# Patient Record
Sex: MALE | Race: WHITE | NOT HISPANIC OR LATINO | Employment: FULL TIME | URBAN - METROPOLITAN AREA
[De-identification: names, ages, dates, MRNs, and addresses within clinical notes are randomized per-mention and may not be internally consistent; named-entity substitution may affect disease eponyms.]

---

## 2020-01-27 ENCOUNTER — HOSPITAL ENCOUNTER (EMERGENCY)
Facility: MEDICAL CENTER | Age: 25
End: 2020-01-27
Attending: EMERGENCY MEDICINE

## 2020-01-27 VITALS
RESPIRATION RATE: 16 BRPM | OXYGEN SATURATION: 96 % | DIASTOLIC BLOOD PRESSURE: 64 MMHG | TEMPERATURE: 97.5 F | HEART RATE: 54 BPM | WEIGHT: 123.22 LBS | HEIGHT: 66 IN | BODY MASS INDEX: 19.8 KG/M2 | SYSTOLIC BLOOD PRESSURE: 122 MMHG

## 2020-01-27 DIAGNOSIS — K04.7 DENTAL INFECTION: Primary | ICD-10-CM

## 2020-01-27 PROCEDURE — 700102 HCHG RX REV CODE 250 W/ 637 OVERRIDE(OP): Performed by: EMERGENCY MEDICINE

## 2020-01-27 PROCEDURE — A9270 NON-COVERED ITEM OR SERVICE: HCPCS | Performed by: EMERGENCY MEDICINE

## 2020-01-27 PROCEDURE — 99283 EMERGENCY DEPT VISIT LOW MDM: CPT

## 2020-01-27 RX ORDER — IBUPROFEN 200 MG
200 TABLET ORAL EVERY 6 HOURS PRN
COMMUNITY

## 2020-01-27 RX ORDER — AMOXICILLIN AND CLAVULANATE POTASSIUM 875; 125 MG/1; MG/1
1 TABLET, FILM COATED ORAL ONCE
Status: COMPLETED | OUTPATIENT
Start: 2020-01-27 | End: 2020-01-27

## 2020-01-27 RX ORDER — HYDROCODONE BITARTRATE AND ACETAMINOPHEN 5; 325 MG/1; MG/1
1 TABLET ORAL ONCE
Status: COMPLETED | OUTPATIENT
Start: 2020-01-27 | End: 2020-01-27

## 2020-01-27 RX ORDER — AMOXICILLIN AND CLAVULANATE POTASSIUM 875; 125 MG/1; MG/1
1 TABLET, FILM COATED ORAL 2 TIMES DAILY
Qty: 14 TAB | Refills: 0 | Status: SHIPPED | OUTPATIENT
Start: 2020-01-27 | End: 2020-02-03

## 2020-01-27 RX ADMIN — AMOXICILLIN AND CLAVULANATE POTASSIUM 1 TABLET: 875; 125 TABLET, FILM COATED ORAL at 17:14

## 2020-01-27 RX ADMIN — HYDROCODONE BITARTRATE AND ACETAMINOPHEN 1 TABLET: 5; 325 TABLET ORAL at 17:14

## 2020-01-27 SDOH — HEALTH STABILITY: MENTAL HEALTH: HOW OFTEN DO YOU HAVE 6 OR MORE DRINKS ON ONE OCCASION?: NEVER

## 2020-01-27 SDOH — HEALTH STABILITY: MENTAL HEALTH: HOW MANY STANDARD DRINKS CONTAINING ALCOHOL DO YOU HAVE ON A TYPICAL DAY?: 1 OR 2

## 2020-01-27 SDOH — HEALTH STABILITY: MENTAL HEALTH: HOW OFTEN DO YOU HAVE A DRINK CONTAINING ALCOHOL?: 2-4 TIMES A MONTH

## 2020-01-27 ASSESSMENT — ENCOUNTER SYMPTOMS
EYE DISCHARGE: 0
FEVER: 0
EYE REDNESS: 0
EYE PAIN: 0

## 2020-01-27 NOTE — ED TRIAGE NOTES
Chief Complaint   Patient presents with   • Dental Pain     x2 months, gradually worsening x2 days   • Jaw Pain     x2 months, denies trauma     Patient to triage via ambulation, with a steady gait, patient A&O x4.  Patient denies difficulty breathing, managing oral secretions w/o difficulty.      Explained wait time and triage process to pt. Pt placed back in lobby, told to notify ED tech or triage RN of any changes, verbalized understanding.

## 2020-01-28 NOTE — ED PROVIDER NOTES
"ED Provider Note   1/27/2020  4:52 PM    Means of Arrival: Walk In  History obtained by: patient  Limitations: none    CHIEF COMPLAINT  Chief Complaint   Patient presents with   • Dental Pain     x2 months, gradually worsening x2 days   • Jaw Pain     x2 months, denies trauma       HPI  Suhas Haro is a 24 y.o. male otherwise healthy presents with concerns of left maxillary face pain.  Pain started 2 days ago.  He has had similar pain before.  However pain has never been this bad and has been progressing over the past 2 days.  No fevers.  No difficulty swallowing.  No trismus or malocclusion.  Describes the pain as throbbing radiating across right cheek.  He believes there is also a small amount of swelling there.  He has tried Advil but continues to have the pain.    REVIEW OF SYSTEMS  Review of Systems   Constitutional: Negative for fever.   HENT:        See HPI   Eyes: Negative for pain, discharge and redness.     See HPI for further details.     PAST MEDICAL HISTORY   Denies any past medical problems    SOCIAL HISTORY  Social History     Tobacco Use   • Smoking status: Current Every Day Smoker     Packs/day: 0.25     Years: 11.00     Pack years: 2.75     Start date: 2009   • Smokeless tobacco: Never Used   Substance and Sexual Activity   • Alcohol use: Yes     Frequency: 2-4 times a month     Drinks per session: 1 or 2     Binge frequency: Never   • Drug use: Never   • Sexual activity: Not on file       SURGICAL HISTORY  patient denies any surgical history    CURRENT MEDICATIONS  Home Medications     Reviewed by Crystal Faye R.N. (Registered Nurse) on 01/27/20 at 1525  Med List Status: Complete   Medication Last Dose Status   ibuprofen (MOTRIN) 200 MG Tab 1/27/2020 Active                ALLERGIES  No Known Allergies    PHYSICAL EXAM  VITAL SIGNS: /64   Pulse (!) 54   Temp 36.4 °C (97.5 °F) (Temporal)   Resp 16   Ht 1.676 m (5' 6\")   Wt 55.9 kg (123 lb 3.4 oz)   SpO2 96%   BMI 19.89 " kg/m²    Pulse ox interpretation: I interpret this pulse ox as normal.  Constitutional: Alert in no apparent distress.  HENT: Normocephalic, Atraumatic, Bilateral external ears normal. Nose normal.  Tenderness over the right maxilla.  There is not significant edema.  No erythema on the face.  Eyes: Pupils are equal. Conjunctiva normal, non-icteric.  No pain with eye movements.  No discharge.  Heart: Regular rate and rythm, no murmurs.    Lungs: No respiratory distress, regular respirations.  Skin: Warm, Dry, No erythema, No rash.   Neurologic: Alert, Grossly non-focal. No slurred speech. Moving extremities normally.   MSK: Full range of motion of neck without pain.  Psychiatric: Affect normal, Judgment normal, Mood normal, Appears appropriate and not intoxicated.   Physical Exam   HENT:   Head:       Mouth/Throat:             COURSE & MEDICAL DECISION MAKING  Pertinent Labs & Imaging studies reviewed. (See chart for details)    4:52 PM This is an emergent evaluation of a 24 y.o., male who presents with concerns of right maxillary pain.  At the right maxillary premolars there is significant decay.  Gums appear inflamed in this area.  There is no fluctuance.  Minimal facial edema.  Normal eye movements.  No eye redness.  No neurologic deficits.  I suspect most likely dental infection, he will be treated with Augmentin.  He was asked to return if he develops fever, significant swelling, eye pain or any other serious changes.  Clinic contact information.  Pain here is acutely treated with 1 Norco.     The patient will return for worsening symptoms and is stable at the time of discharge. The patient verbalizes understanding. Guidance was provided on appropriate use of medications including driving under the influence, overdose, and side effects.     FINAL IMPRESSION  1. Dental infection            Electronically signed by: Juventino Rizo II, M.D., 1/27/2020 4:52 PM